# Patient Record
Sex: FEMALE | Race: WHITE | Employment: FULL TIME | URBAN - METROPOLITAN AREA
[De-identification: names, ages, dates, MRNs, and addresses within clinical notes are randomized per-mention and may not be internally consistent; named-entity substitution may affect disease eponyms.]

---

## 2018-10-02 ENCOUNTER — HOSPITAL ENCOUNTER (OUTPATIENT)
Age: 23
Discharge: HOME | End: 2018-10-02
Payer: COMMERCIAL

## 2018-10-02 VITALS
RESPIRATION RATE: 16 BRPM | DIASTOLIC BLOOD PRESSURE: 80 MMHG | OXYGEN SATURATION: 99 % | TEMPERATURE: 99 F | HEART RATE: 92 BPM | SYSTOLIC BLOOD PRESSURE: 125 MMHG

## 2018-10-02 DIAGNOSIS — O21.0 HYPEREMESIS GRAVIDARUM: Primary | ICD-10-CM

## 2018-10-02 PROCEDURE — 96361 HYDRATE IV INFUSION ADD-ON: CPT

## 2018-10-02 PROCEDURE — 87086 URINE CULTURE/COLONY COUNT: CPT | Performed by: NURSE PRACTITIONER

## 2018-10-02 PROCEDURE — 96374 THER/PROPH/DIAG INJ IV PUSH: CPT

## 2018-10-02 PROCEDURE — 99204 OFFICE O/P NEW MOD 45 MIN: CPT

## 2018-10-02 PROCEDURE — 81002 URINALYSIS NONAUTO W/O SCOPE: CPT | Performed by: NURSE PRACTITIONER

## 2018-10-02 PROCEDURE — 80047 BASIC METABLC PNL IONIZED CA: CPT

## 2018-10-02 RX ORDER — METOCLOPRAMIDE HYDROCHLORIDE 5 MG/ML
10 INJECTION INTRAMUSCULAR; INTRAVENOUS ONCE
Status: COMPLETED | OUTPATIENT
Start: 2018-10-02 | End: 2018-10-02

## 2018-10-02 RX ORDER — SODIUM CHLORIDE 9 MG/ML
1000 INJECTION, SOLUTION INTRAVENOUS ONCE
Status: COMPLETED | OUTPATIENT
Start: 2018-10-02 | End: 2018-10-02

## 2018-10-02 NOTE — ED INITIAL ASSESSMENT (HPI)
, pt approx 8 weeks pregnant. States vomiting x 2 weeks, has appt 10/17 with OB. Called office and they called in reglan but told to come in for IV fluids. Told to go to ED last week but wait time was 6 hours. This am lightheaded.  States unable to keep

## 2018-10-02 NOTE — ED PROVIDER NOTES
[place] [id number] [facility] [place]   Patient presents with:  Nausea/Vomiting/Diarrhea (gastrointestinal)    Stated Complaint: vomiting/diarrhea    17-year-old female who presents to the immediate care with complaints of nausea vo systems reviewed and negative except as noted above.     Physical Exam     ED Triage Vitals [10/02/18 1144]   /83   Pulse 87   Resp 16   Temp 99 °F (37.2 °C)   Temp src Temporal   SpO2 99 %   O2 Device None (Room air)       Current:/80   Pulse 9 L normal saline bolus, 10 mg of Reglan IV, will check patient's i-STAT and urinalysis.   I-STAT is unremarkable           MDM   I have discussed with the patient and/or family the results of test, differential diagnosis, treatment plan, warning signs and sy